# Patient Record
Sex: MALE | Race: WHITE | NOT HISPANIC OR LATINO
[De-identification: names, ages, dates, MRNs, and addresses within clinical notes are randomized per-mention and may not be internally consistent; named-entity substitution may affect disease eponyms.]

---

## 2017-10-02 ENCOUNTER — APPOINTMENT (OUTPATIENT)
Dept: DERMATOLOGY | Facility: CLINIC | Age: 62
End: 2017-10-02
Payer: COMMERCIAL

## 2017-10-02 VITALS — BODY MASS INDEX: 35.21 KG/M2 | WEIGHT: 260 LBS | HEIGHT: 72 IN

## 2017-10-02 DIAGNOSIS — D18.00 HEMANGIOMA UNSPECIFIED SITE: ICD-10-CM

## 2017-10-02 DIAGNOSIS — Z41.1 ENCOUNTER FOR COSMETIC SURGERY: ICD-10-CM

## 2017-10-02 DIAGNOSIS — L91.8 OTHER HYPERTROPHIC DISORDERS OF THE SKIN: ICD-10-CM

## 2017-10-02 DIAGNOSIS — Z85.828 PERSONAL HISTORY OF OTHER MALIGNANT NEOPLASM OF SKIN: ICD-10-CM

## 2017-10-02 DIAGNOSIS — L81.4 OTHER MELANIN HYPERPIGMENTATION: ICD-10-CM

## 2017-10-02 DIAGNOSIS — Z84.0 FAMILY HISTORY OF DISEASES OF THE SKIN AND SUBCUTANEOUS TISSUE: ICD-10-CM

## 2017-10-02 DIAGNOSIS — Z80.8 FAMILY HISTORY OF MALIGNANT NEOPLASM OF OTHER ORGANS OR SYSTEMS: ICD-10-CM

## 2017-10-02 DIAGNOSIS — D48.5 NEOPLASM OF UNCERTAIN BEHAVIOR OF SKIN: ICD-10-CM

## 2017-10-02 DIAGNOSIS — Z00.00 ENCOUNTER FOR GENERAL ADULT MEDICAL EXAMINATION W/OUT ABNORMAL FINDINGS: ICD-10-CM

## 2017-10-02 PROCEDURE — 40808 BIOPSY OF MOUTH LESION: CPT

## 2017-10-02 PROCEDURE — 99213 OFFICE O/P EST LOW 20 MIN: CPT | Mod: 25

## 2017-10-02 PROCEDURE — D0107: CPT

## 2017-10-02 RX ORDER — HYDROCHLOROTHIAZIDE 25 MG/1
25 TABLET ORAL
Refills: 0 | Status: ACTIVE | COMMUNITY

## 2017-10-05 LAB — CORE LAB BIOPSY: NORMAL

## 2018-05-10 ENCOUNTER — APPOINTMENT (OUTPATIENT)
Dept: DERMATOLOGY | Facility: CLINIC | Age: 63
End: 2018-05-10
Payer: COMMERCIAL

## 2018-05-10 DIAGNOSIS — B07.9 VIRAL WART, UNSPECIFIED: ICD-10-CM

## 2018-05-10 PROCEDURE — 17110 DESTRUCTION B9 LES UP TO 14: CPT

## 2018-07-23 PROBLEM — Z85.828 HISTORY OF BASAL CELL CARCINOMA: Status: RESOLVED | Noted: 2017-10-02 | Resolved: 2018-07-23

## 2021-06-20 ENCOUNTER — TRANSCRIPTION ENCOUNTER (OUTPATIENT)
Age: 66
End: 2021-06-20

## 2021-07-02 ENCOUNTER — TRANSCRIPTION ENCOUNTER (OUTPATIENT)
Age: 66
End: 2021-07-02

## 2021-07-29 ENCOUNTER — TRANSCRIPTION ENCOUNTER (OUTPATIENT)
Age: 66
End: 2021-07-29

## 2021-08-24 ENCOUNTER — FORM ENCOUNTER (OUTPATIENT)
Age: 66
End: 2021-08-24

## 2021-08-25 ENCOUNTER — TRANSCRIPTION ENCOUNTER (OUTPATIENT)
Age: 66
End: 2021-08-25

## 2021-08-27 ENCOUNTER — APPOINTMENT (OUTPATIENT)
Dept: DISASTER EMERGENCY | Facility: HOSPITAL | Age: 66
End: 2021-08-27

## 2021-08-27 ENCOUNTER — OUTPATIENT (OUTPATIENT)
Dept: OUTPATIENT SERVICES | Facility: HOSPITAL | Age: 66
LOS: 1 days | End: 2021-08-27
Payer: COMMERCIAL

## 2021-08-27 VITALS
TEMPERATURE: 99 F | HEIGHT: 72 IN | DIASTOLIC BLOOD PRESSURE: 83 MMHG | WEIGHT: 265 LBS | HEART RATE: 89 BPM | OXYGEN SATURATION: 94 % | RESPIRATION RATE: 18 BRPM | SYSTOLIC BLOOD PRESSURE: 148 MMHG

## 2021-08-27 VITALS
RESPIRATION RATE: 18 BRPM | SYSTOLIC BLOOD PRESSURE: 119 MMHG | HEART RATE: 86 BPM | DIASTOLIC BLOOD PRESSURE: 76 MMHG | OXYGEN SATURATION: 95 % | TEMPERATURE: 100 F

## 2021-08-27 DIAGNOSIS — U07.1 COVID-19: ICD-10-CM

## 2021-08-27 PROCEDURE — M0243: CPT

## 2021-08-27 RX ORDER — ACETAMINOPHEN 500 MG
650 TABLET ORAL ONCE
Refills: 0 | Status: COMPLETED | OUTPATIENT
Start: 2021-08-27 | End: 2021-08-27

## 2021-08-27 RX ORDER — SODIUM CHLORIDE 9 MG/ML
250 INJECTION INTRAMUSCULAR; INTRAVENOUS; SUBCUTANEOUS
Refills: 0 | Status: DISCONTINUED | OUTPATIENT
Start: 2021-08-27 | End: 2021-09-10

## 2021-08-27 RX ADMIN — SODIUM CHLORIDE 25 MILLILITER(S): 9 INJECTION INTRAMUSCULAR; INTRAVENOUS; SUBCUTANEOUS at 11:30

## 2021-08-27 RX ADMIN — Medication 650 MILLIGRAM(S): at 12:11

## 2021-08-27 NOTE — CHART NOTE - NSCHARTNOTEFT_GEN_A_CORE
CC: Monoclonal Antibody Infusion/COVID 19 Positiveon 8/25/21      History: Patient presents for infusion of monoclonal antibody infusion. Patient has been screened and was deemed to be a candidate.    Symptoms/ Criteria: Fever, cough, malaise, congestion    Risk Profile includes: HTN, BMI >25, Pt has only 1 kidney    casirivimab/imdevimab in sodium chloride 0.9% (EUA) IVPB 100 milliLiter(s) IV Intermittent once  sodium chloride 0.9%. 250 milliLiter(s) IV Continuous <Continuous>      PMHx:  Infection due to severe acute respiratory syndrome coronavirus 2 (SARS-CoV-2)          T(C): 37.4 (08-27-21 @ 10:20), Max: 37.4 (08-27-21 @ 10:20)  HR: 89 (08-27-21 @ 10:20) (89 - 89)  BP: 148/83 (08-27-21 @ 10:20) (148/83 - 148/83)  RR: 18 (08-27-21 @ 10:20) (18 - 18)  SpO2: 94% (08-27-21 @ 10:20) (94% - 94%)      PE:   Appearance: NAD	  HEENT:   Normal oral mucosa.   Lymphatic: No lymphadenopathy  Cardiovascular: Normal S1 S2, No JVD, No murmurs, No edema  Respiratory: Lungs clear to auscultation	  Gastrointestinal:  Soft, Non-tender. No guarding   Skin: warm and dry  Neurologic: Non-focal  Extremities: Normal range of motion.     ASSESSMENT:  Pt is a 66y year old Male Covid +  referred to the infusion center for Monoclonal antibody infusion (Regeneron).    Pt is fully vaccinated [facility] Moderna last dose-3/2021    PLAN:  - infusion procedure explained to patient   - Consent for monoclonal antibody infusion obtained   - Risk & benefits discussed/all questions answered  - infuse Regeneron over 21 minutes  - will observe patient for one hour post infusion  and then if stable discharge home with oupt follow up as planned by PMD.    POST INFUSION ASSESSMENT:   DISCHARGE at approximately  1  hour post infusion    - Patient tolerated infusion well denies complaints of chest pain/SOB/dizziness/ palps  - VSS for discharge home  - D/C instructions given/ fact sheet included.  - Patient to follow-up with PCP as needed.

## 2021-08-28 ENCOUNTER — TRANSCRIPTION ENCOUNTER (OUTPATIENT)
Age: 66
End: 2021-08-28

## 2021-08-30 ENCOUNTER — APPOINTMENT (OUTPATIENT)
Dept: PULMONOLOGY | Facility: CLINIC | Age: 66
End: 2021-08-30

## 2021-09-15 ENCOUNTER — TRANSCRIPTION ENCOUNTER (OUTPATIENT)
Age: 66
End: 2021-09-15

## 2021-10-01 ENCOUNTER — TRANSCRIPTION ENCOUNTER (OUTPATIENT)
Age: 66
End: 2021-10-01

## 2021-10-15 ENCOUNTER — APPOINTMENT (OUTPATIENT)
Dept: PULMONOLOGY | Facility: CLINIC | Age: 66
End: 2021-10-15
Payer: COMMERCIAL

## 2021-10-15 VITALS
BODY MASS INDEX: 36.21 KG/M2 | SYSTOLIC BLOOD PRESSURE: 140 MMHG | OXYGEN SATURATION: 96 % | DIASTOLIC BLOOD PRESSURE: 80 MMHG | RESPIRATION RATE: 16 BRPM | HEART RATE: 72 BPM | WEIGHT: 267 LBS

## 2021-10-15 DIAGNOSIS — Z86.16 PERSONAL HISTORY OF COVID-19: ICD-10-CM

## 2021-10-15 DIAGNOSIS — N20.0 CALCULUS OF KIDNEY: ICD-10-CM

## 2021-10-15 DIAGNOSIS — Z78.9 OTHER SPECIFIED HEALTH STATUS: ICD-10-CM

## 2021-10-15 PROCEDURE — 99204 OFFICE O/P NEW MOD 45 MIN: CPT

## 2021-10-15 NOTE — REVIEW OF SYSTEMS
[Nasal Congestion] : nasal congestion [Cough] : cough [Chest Tightness] : chest tightness [Sputum] : sputum [SOB on Exertion] : sob on exertion [Fever] : no fever [Chills] : no chills [Postnasal Drip] : no postnasal drip [Sinus Problems] : no sinus problems [Pleuritic Pain] : no pleuritic pain [Wheezing] : no wheezing [Chest Discomfort] : no chest discomfort [Edema] : no edema [Palpitations] : no palpitations [Syncope] : no syncope [Seasonal Allergies] : no seasonal allergies [GERD] : no gerd [Abdominal Pain] : no abdominal pain [Nausea] : no nausea [Vomiting] : no vomiting [Diarrhea] : no diarrhea [Constipation] : no constipation [Back Pain] : no back pain [Anemia] : no anemia [Headache] : no headache [Seizures] : no seizures [Dizziness] : no dizziness [Numbness] : no numbness [Paralysis] : no paralysis [Confusion] : no confusion [Depression] : no depression [Anxiety] : no anxiety [Diabetes] : no diabetes [Thyroid Problem] : no thyroid problem

## 2021-10-15 NOTE — DISCUSSION/SUMMARY
[FreeTextEntry1] : \par #1. Will schedule PFTs in near future to assess lung function \par #2. The patient does not appear to require chronic BD therapy at this time\par #3. Diet and exercise for weight loss\par #4. SOBOE is likely related to weight or deconditioning \par #5. Brief prednisone taper for residual cough\par #6. CXR to evaluate SOBOE given recent Covid infection\par #7. Albuterol inhaler as needed given wheeze\par #8. F/u in one month with CXR and PFTs\par #9. Pt had both Covid vaccines \par #10. Reviewed risks of exposure and symptoms of Covid-19 virus, including how the virus is spread and precautions to avoid ben virus.\par \par Patient's questions were answered and patient appears to understand these recommendations

## 2021-10-15 NOTE — PHYSICAL EXAM
[No Acute Distress] : no acute distress [Well Nourished] : well nourished [Well Developed] : well developed [Low Lying Soft Palate] : low lying soft palate [Enlarged Base of the Tongue] : enlarged base of the tongue [IV] : Mallampati Class: IV [Normal Appearance] : normal appearance [Supple] : supple [Normal Rate/Rhythm] : normal rate/rhythm [Normal S1, S2] : normal s1, s2 [No Murmurs] : no murmurs [No Resp Distress] : no resp distress [No Acc Muscle Use] : no acc muscle use [Normal Rhythm and Effort] : normal rhythm and effort [Clear to Auscultation Bilaterally] : clear to auscultation bilaterally [No Abnormalities] : no abnormalities [Benign] : benign [Not Tender] : not tender [Soft] : soft [No Clubbing] : no clubbing [No Edema] : no edema [No Focal Deficits] : no focal deficits [Oriented x3] : oriented x3 [TextBox_2] : Pt is obese [TextBox_11] : Moderate to severe oropharyngeal crowding.

## 2021-10-15 NOTE — HISTORY OF PRESENT ILLNESS
[Never] : never [Initial Evaluation] : an initial evaluation of [Excess Weight] : excess weight [Currently Experiencing] : The patient is currently experiencing symptoms. [Dyspnea] : dyspnea [Low Calorie Diet] : low calorie diet [Fair Compliance] : fair compliance with treatment [Fair Tolerance] : fair tolerance of treatment [Poor Symptom Control] : poor symptom control [None] : No associated conditions are noted [High] : high [Low Calorie] : low calorie [Well Balanced Diet] : well balanced meals [___ Times/Week] : exercises [unfilled] times per week [Aerobic Conditioning] : aerobic conditioning [Strength Training] : strength training [TextBox_4] : Pt reports having Covid infection 8/25/21 when he developed low grade fever, loss of taste and smell, cough, SOB. He received Monoclonal ab therapy with improvement. He is now back to baseline other than congested, productive cough, and SOBOE.\par He is a never smoker.\par He usually exercises regularly.\par Pt denies significant sleep complaints. \par He recently completed a course of Z-obdulia for cough and purulent sputum with some improvement.\par

## 2021-10-15 NOTE — REASON FOR VISIT
[Initial] : an initial visit [Cough] : cough [Shortness of Breath] : shortness of breath [Obesity] : obesity [TextBox_44] : prior Covid infection

## 2021-10-15 NOTE — CONSULT LETTER
[Dear  ___] : Dear  [unfilled], [Consult Letter:] : I had the pleasure of evaluating your patient, [unfilled]. [Please see my note below.] : Please see my note below. [Consult Closing:] : Thank you very much for allowing me to participate in the care of this patient.  If you have any questions, please do not hesitate to contact me. [Sincerely,] : Sincerely, [FreeTextEntry3] : Harvey Donald MD, FCCP, D. ABSM\par Pulmonary and Sleep Medicine\par [facility] at [place]

## 2022-11-09 ENCOUNTER — NON-APPOINTMENT (OUTPATIENT)
Age: 67
End: 2022-11-09

## 2023-07-27 ENCOUNTER — APPOINTMENT (OUTPATIENT)
Dept: PULMONOLOGY | Facility: CLINIC | Age: 68
End: 2023-07-27
Payer: MEDICARE

## 2023-07-27 VITALS
SYSTOLIC BLOOD PRESSURE: 122 MMHG | HEART RATE: 77 BPM | DIASTOLIC BLOOD PRESSURE: 68 MMHG | RESPIRATION RATE: 16 BRPM | BODY MASS INDEX: 35.62 KG/M2 | WEIGHT: 263 LBS | OXYGEN SATURATION: 96 % | HEIGHT: 72 IN

## 2023-07-27 DIAGNOSIS — E66.9 OBESITY, UNSPECIFIED: ICD-10-CM

## 2023-07-27 DIAGNOSIS — R06.02 SHORTNESS OF BREATH: ICD-10-CM

## 2023-07-27 DIAGNOSIS — R05.9 COUGH, UNSPECIFIED: ICD-10-CM

## 2023-07-27 DIAGNOSIS — R06.2 WHEEZING: ICD-10-CM

## 2023-07-27 PROCEDURE — 99214 OFFICE O/P EST MOD 30 MIN: CPT

## 2023-07-27 RX ORDER — ALLOPURINOL 100 MG/1
100 TABLET ORAL
Refills: 0 | Status: ACTIVE | COMMUNITY

## 2023-07-27 RX ORDER — ENALAPRIL MALEATE 5 MG/1
TABLET ORAL
Refills: 0 | Status: ACTIVE | COMMUNITY

## 2023-07-27 RX ORDER — PREDNISONE 10 MG/1
10 TABLET ORAL
Qty: 50 | Refills: 0 | Status: DISCONTINUED | COMMUNITY
Start: 2021-10-15 | End: 2023-07-27

## 2023-07-27 RX ORDER — ALBUTEROL SULFATE 90 UG/1
108 (90 BASE) AEROSOL, METERED RESPIRATORY (INHALATION)
Qty: 1 | Refills: 2 | Status: DISCONTINUED | COMMUNITY
Start: 2021-10-15 | End: 2023-07-27

## 2023-07-27 NOTE — CONSULT LETTER
[Dear  ___] : Dear  [unfilled], [Consult Letter:] : I had the pleasure of evaluating your patient, [unfilled]. [Please see my note below.] : Please see my note below. [Consult Closing:] : Thank you very much for allowing me to participate in the care of this patient.  If you have any questions, please do not hesitate to contact me. [Sincerely,] : Sincerely, [FreeTextEntry3] : Harvey Donald MD, FCCP, D. ABSM\par Pulmonary and Sleep Medicine\par [facility] at [place]

## 2023-07-27 NOTE — DISCUSSION/SUMMARY
[FreeTextEntry1] : \par #1. Will schedule PFTs in near future to assess lung function \par #2. The patient does not appear to require chronic BD therapy at this time\par #3. Diet and exercise for weight loss\par #4. SOBOE is likely related to weight or deconditioning \par #5. CXR to evaluate SOBOE \par #6. Offered Albuterol inhaler as needed but he would like to hold off for now\par #7. Consider cardiac w/u for possible cause of SOBOE\par #8. F/u As soon as can be scheduled with CXR and PFTs/gretchen\par #9. Pt had both Covid vaccines \par \par The patient expressed understanding and agreement with the above recommendations/plan and accepts responsibility to be compliant with recommended testing, therapies, and f/u visits.\par All relevant questions and concerns were addressed.

## 2023-07-27 NOTE — HISTORY OF PRESENT ILLNESS
[Excess Weight] : excess weight [Currently Experiencing] : The patient is currently experiencing symptoms. [Dyspnea] : dyspnea [Low Calorie Diet] : low calorie diet [Fair Compliance] : fair compliance with treatment [Fair Tolerance] : fair tolerance of treatment [Poor Symptom Control] : poor symptom control [None] : No associated conditions are noted [High] : high [Low Calorie] : low calorie [Well Balanced Diet] : well balanced meals [___ Times/Week] : exercises [unfilled] times per week [Aerobic Conditioning] : aerobic conditioning [Strength Training] : strength training [Follow-Up - Routine Clinic] : a routine clinic follow-up of [TextBox_4] : Never smoker.\par Pt reports having Covid infection 8/25/21 when he developed low grade fever, loss of taste and smell, cough, SOB. He received Monoclonal ab therapy with improvement. He is now back to baseline other than congested, productive cough, and SOBOE.\par Repeat Covid infection 2/2022 with 1-2 days of URI symptoms and resolved spontaneously; he did not require therapy\par He usually exercises regularly.\par Pt denies significant sleep complaints. \par He recently completed a course of Z-obdulia for cough and purulent sputum with some improvement.\par Last seen 10/2021\par Significant SOBOE with aerobic exercise or heavy lifting. Improves with deep breathing.\par Reports only having one functioning kidney.\par

## 2023-07-27 NOTE — REASON FOR VISIT
[Follow-Up] : a follow-up visit [Cough] : cough [Shortness of Breath] : shortness of breath [Obesity] : obesity [TextBox_44] : prior Covid infection

## 2023-08-08 ENCOUNTER — NON-APPOINTMENT (OUTPATIENT)
Age: 68
End: 2023-08-08

## 2023-10-25 ENCOUNTER — APPOINTMENT (OUTPATIENT)
Dept: PULMONOLOGY | Facility: CLINIC | Age: 68
End: 2023-10-25

## 2024-06-20 ENCOUNTER — OFFICE (OUTPATIENT)
Dept: URBAN - METROPOLITAN AREA CLINIC 94 | Facility: CLINIC | Age: 69
Setting detail: OPHTHALMOLOGY
End: 2024-06-20
Payer: MEDICARE

## 2024-06-20 DIAGNOSIS — H25.13: ICD-10-CM

## 2024-06-20 DIAGNOSIS — H43.393: ICD-10-CM

## 2024-06-20 PROCEDURE — 92004 COMPRE OPH EXAM NEW PT 1/>: CPT | Performed by: OPHTHALMOLOGY

## 2024-06-20 PROCEDURE — 92250 FUNDUS PHOTOGRAPHY W/I&R: CPT | Performed by: OPHTHALMOLOGY

## 2024-06-20 ASSESSMENT — CONFRONTATIONAL VISUAL FIELD TEST (CVF)
OS_FINDINGS: FULL
OD_FINDINGS: FULL

## 2024-07-05 ENCOUNTER — OFFICE (OUTPATIENT)
Dept: URBAN - METROPOLITAN AREA CLINIC 94 | Facility: CLINIC | Age: 69
Setting detail: OPHTHALMOLOGY
End: 2024-07-05
Payer: MEDICARE

## 2024-07-05 DIAGNOSIS — H43.393: ICD-10-CM

## 2024-07-05 DIAGNOSIS — H25.12: ICD-10-CM

## 2024-07-05 DIAGNOSIS — H33.312: ICD-10-CM

## 2024-07-05 DIAGNOSIS — H25.11: ICD-10-CM

## 2024-07-05 DIAGNOSIS — H25.13: ICD-10-CM

## 2024-07-05 PROCEDURE — 92250 FUNDUS PHOTOGRAPHY W/I&R: CPT | Performed by: OPHTHALMOLOGY

## 2024-07-05 PROCEDURE — 92014 COMPRE OPH EXAM EST PT 1/>: CPT | Performed by: OPHTHALMOLOGY

## 2024-07-05 ASSESSMENT — CONFRONTATIONAL VISUAL FIELD TEST (CVF)
OD_FINDINGS: FULL
OS_FINDINGS: FULL

## 2024-08-13 ENCOUNTER — ASC (OUTPATIENT)
Dept: URBAN - METROPOLITAN AREA SURGERY 8 | Facility: SURGERY | Age: 69
Setting detail: OPHTHALMOLOGY
End: 2024-08-13
Payer: MEDICARE

## 2024-08-13 DIAGNOSIS — H25.11: ICD-10-CM

## 2024-08-13 DIAGNOSIS — H52.211: ICD-10-CM

## 2024-08-13 PROCEDURE — 66984 XCAPSL CTRC RMVL W/O ECP: CPT | Mod: RT | Performed by: OPHTHALMOLOGY

## 2024-08-13 PROCEDURE — FEMTO FEMTOSECOND LASER: Mod: GY | Performed by: OPHTHALMOLOGY

## 2024-08-13 PROCEDURE — 68841 INSJ RX ELUT IMPLT LAC CANAL: CPT | Mod: RT | Performed by: OPHTHALMOLOGY

## 2024-08-13 PROCEDURE — VIVITY VIVITY: Performed by: OPHTHALMOLOGY

## 2024-08-13 PROCEDURE — A9270 NON-COVERED ITEM OR SERVICE: HCPCS | Mod: GY | Performed by: OPHTHALMOLOGY

## 2024-08-14 ENCOUNTER — RX ONLY (RX ONLY)
Age: 69
End: 2024-08-14

## 2024-08-14 ENCOUNTER — OFFICE (OUTPATIENT)
Dept: URBAN - METROPOLITAN AREA CLINIC 94 | Facility: CLINIC | Age: 69
Setting detail: OPHTHALMOLOGY
End: 2024-08-14
Payer: MEDICARE

## 2024-08-14 DIAGNOSIS — Z96.1: ICD-10-CM

## 2024-08-14 PROCEDURE — 99024 POSTOP FOLLOW-UP VISIT: CPT | Performed by: PHYSICIAN ASSISTANT

## 2024-08-14 ASSESSMENT — CONFRONTATIONAL VISUAL FIELD TEST (CVF)
OD_FINDINGS: FULL
OS_FINDINGS: FULL

## 2024-08-21 ENCOUNTER — OFFICE (OUTPATIENT)
Dept: URBAN - METROPOLITAN AREA CLINIC 94 | Facility: CLINIC | Age: 69
Setting detail: OPHTHALMOLOGY
End: 2024-08-21
Payer: MEDICARE

## 2024-08-21 DIAGNOSIS — H25.12: ICD-10-CM

## 2024-08-21 DIAGNOSIS — Z96.1: ICD-10-CM

## 2024-08-21 PROCEDURE — 99024 POSTOP FOLLOW-UP VISIT: CPT | Performed by: OPHTHALMOLOGY

## 2024-08-21 PROCEDURE — 92136 OPHTHALMIC BIOMETRY: CPT | Mod: 26,LT | Performed by: OPHTHALMOLOGY

## 2024-08-21 ASSESSMENT — CONFRONTATIONAL VISUAL FIELD TEST (CVF)
OS_FINDINGS: FULL
OD_FINDINGS: FULL

## 2024-08-28 ENCOUNTER — ASC (OUTPATIENT)
Dept: URBAN - METROPOLITAN AREA SURGERY 8 | Facility: SURGERY | Age: 69
Setting detail: OPHTHALMOLOGY
End: 2024-08-28
Payer: MEDICARE

## 2024-08-28 DIAGNOSIS — H52.212: ICD-10-CM

## 2024-08-28 DIAGNOSIS — H25.12: ICD-10-CM

## 2024-08-28 PROCEDURE — A9270 NON-COVERED ITEM OR SERVICE: HCPCS | Mod: GY | Performed by: OPHTHALMOLOGY

## 2024-08-28 PROCEDURE — 68841 INSJ RX ELUT IMPLT LAC CANAL: CPT | Mod: 79,LT | Performed by: OPHTHALMOLOGY

## 2024-08-28 PROCEDURE — V2787 ASTIGMATISM-CORRECT FUNCTION: HCPCS | Performed by: OPHTHALMOLOGY

## 2024-08-28 PROCEDURE — 66984 XCAPSL CTRC RMVL W/O ECP: CPT | Mod: 79,LT | Performed by: OPHTHALMOLOGY

## 2024-08-28 PROCEDURE — FEMTO FEMTOSECOND LASER: Mod: GY | Performed by: OPHTHALMOLOGY

## 2024-08-29 ENCOUNTER — OFFICE (OUTPATIENT)
Dept: URBAN - METROPOLITAN AREA CLINIC 112 | Facility: CLINIC | Age: 69
Setting detail: OPHTHALMOLOGY
End: 2024-08-29
Payer: MEDICARE

## 2024-08-29 DIAGNOSIS — Z96.1: ICD-10-CM

## 2024-08-29 PROBLEM — H53.001 AMBLYOPIA; RIGHT EYE: Status: ACTIVE | Noted: 2024-08-21

## 2024-08-29 PROBLEM — H26.491 POSTERIOR CAPSULAR OPACIFICATION; RIGHT EYE: Status: ACTIVE | Noted: 2024-08-21

## 2024-08-29 PROCEDURE — 99024 POSTOP FOLLOW-UP VISIT: CPT | Performed by: OPHTHALMOLOGY

## 2024-08-29 ASSESSMENT — CONFRONTATIONAL VISUAL FIELD TEST (CVF)
OD_FINDINGS: FULL
OS_FINDINGS: FULL

## 2024-09-05 ENCOUNTER — RX ONLY (RX ONLY)
Age: 69
End: 2024-09-05

## 2024-09-05 ENCOUNTER — OFFICE (OUTPATIENT)
Dept: URBAN - METROPOLITAN AREA CLINIC 112 | Facility: CLINIC | Age: 69
Setting detail: OPHTHALMOLOGY
End: 2024-09-05
Payer: MEDICARE

## 2024-09-05 DIAGNOSIS — Z96.1: ICD-10-CM

## 2024-09-05 PROCEDURE — 99024 POSTOP FOLLOW-UP VISIT: CPT | Performed by: PHYSICIAN ASSISTANT

## 2024-09-05 ASSESSMENT — CONFRONTATIONAL VISUAL FIELD TEST (CVF)
OD_FINDINGS: FULL
OS_FINDINGS: FULL

## 2024-09-12 ENCOUNTER — OFFICE (OUTPATIENT)
Dept: URBAN - METROPOLITAN AREA CLINIC 112 | Facility: CLINIC | Age: 69
Setting detail: OPHTHALMOLOGY
End: 2024-09-12
Payer: MEDICARE

## 2024-09-12 ENCOUNTER — OFFICE (OUTPATIENT)
Dept: URBAN - METROPOLITAN AREA CLINIC 94 | Facility: CLINIC | Age: 69
Setting detail: OPHTHALMOLOGY
End: 2024-09-12
Payer: MEDICARE

## 2024-09-12 DIAGNOSIS — Z96.1: ICD-10-CM

## 2024-09-12 DIAGNOSIS — S05.02XD: ICD-10-CM

## 2024-09-12 PROCEDURE — 99024 POSTOP FOLLOW-UP VISIT: CPT | Performed by: PHYSICIAN ASSISTANT

## 2024-09-12 PROCEDURE — 99024 POSTOP FOLLOW-UP VISIT: CPT | Performed by: OPHTHALMOLOGY

## 2024-09-12 ASSESSMENT — CONFRONTATIONAL VISUAL FIELD TEST (CVF)
OS_FINDINGS: FULL
OS_FINDINGS: FULL
OD_FINDINGS: FULL
OD_FINDINGS: FULL

## 2024-09-14 ENCOUNTER — RX ONLY (RX ONLY)
Age: 69
End: 2024-09-14

## 2024-09-14 ENCOUNTER — OFFICE (OUTPATIENT)
Dept: URBAN - METROPOLITAN AREA CLINIC 94 | Facility: CLINIC | Age: 69
Setting detail: OPHTHALMOLOGY
End: 2024-09-14
Payer: MEDICARE

## 2024-09-14 DIAGNOSIS — H26.493: ICD-10-CM

## 2024-09-14 DIAGNOSIS — Z96.1: ICD-10-CM

## 2024-09-14 DIAGNOSIS — S05.02XD: ICD-10-CM

## 2024-09-14 PROCEDURE — 99024 POSTOP FOLLOW-UP VISIT: CPT | Performed by: PHYSICIAN ASSISTANT

## 2024-09-14 ASSESSMENT — CONFRONTATIONAL VISUAL FIELD TEST (CVF)
OS_FINDINGS: FULL
OD_FINDINGS: FULL

## 2024-09-15 ENCOUNTER — OFFICE (OUTPATIENT)
Dept: URBAN - METROPOLITAN AREA CLINIC 94 | Facility: CLINIC | Age: 69
Setting detail: OPHTHALMOLOGY
End: 2024-09-15
Payer: MEDICARE

## 2024-09-15 DIAGNOSIS — Z96.1: ICD-10-CM

## 2024-09-15 DIAGNOSIS — S05.02XD: ICD-10-CM

## 2024-09-15 PROCEDURE — 92071 CONTACT LENS FITTING FOR TX: CPT | Mod: LT | Performed by: REGISTERED NURSE

## 2024-09-15 PROCEDURE — 99024 POSTOP FOLLOW-UP VISIT: CPT | Performed by: REGISTERED NURSE

## 2024-09-15 ASSESSMENT — CONFRONTATIONAL VISUAL FIELD TEST (CVF)
OD_FINDINGS: FULL
OS_FINDINGS: FULL

## 2024-09-20 ENCOUNTER — OFFICE (OUTPATIENT)
Dept: URBAN - METROPOLITAN AREA CLINIC 94 | Facility: CLINIC | Age: 69
Setting detail: OPHTHALMOLOGY
End: 2024-09-20
Payer: MEDICARE

## 2024-09-20 DIAGNOSIS — Z96.1: ICD-10-CM

## 2024-09-20 DIAGNOSIS — S05.02XD: ICD-10-CM

## 2024-09-20 PROCEDURE — 99024 POSTOP FOLLOW-UP VISIT: CPT | Performed by: OPHTHALMOLOGY

## 2024-09-20 ASSESSMENT — CONFRONTATIONAL VISUAL FIELD TEST (CVF)
OS_FINDINGS: FULL
OD_FINDINGS: FULL

## 2024-09-23 ENCOUNTER — OFFICE (OUTPATIENT)
Dept: URBAN - METROPOLITAN AREA CLINIC 94 | Facility: CLINIC | Age: 69
Setting detail: OPHTHALMOLOGY
End: 2024-09-23
Payer: MEDICARE

## 2024-09-23 DIAGNOSIS — Z96.1: ICD-10-CM

## 2024-09-23 DIAGNOSIS — S05.02XA: ICD-10-CM

## 2024-09-23 PROCEDURE — 99024 POSTOP FOLLOW-UP VISIT: CPT | Performed by: PHYSICIAN ASSISTANT

## 2024-09-23 ASSESSMENT — CONFRONTATIONAL VISUAL FIELD TEST (CVF)
OD_FINDINGS: FULL
OS_FINDINGS: FULL

## 2024-09-24 ENCOUNTER — OFFICE (OUTPATIENT)
Dept: URBAN - METROPOLITAN AREA CLINIC 114 | Facility: CLINIC | Age: 69
Setting detail: OPHTHALMOLOGY
End: 2024-09-24
Payer: MEDICARE

## 2024-09-24 DIAGNOSIS — S05.02XA: ICD-10-CM

## 2024-09-24 DIAGNOSIS — Z96.1: ICD-10-CM

## 2024-09-24 PROCEDURE — 99024 POSTOP FOLLOW-UP VISIT: CPT | Performed by: OPHTHALMOLOGY

## 2024-09-24 ASSESSMENT — CONFRONTATIONAL VISUAL FIELD TEST (CVF)
OD_FINDINGS: FULL
OS_FINDINGS: FULL

## 2024-09-26 ENCOUNTER — OFFICE (OUTPATIENT)
Dept: URBAN - METROPOLITAN AREA CLINIC 94 | Facility: CLINIC | Age: 69
Setting detail: OPHTHALMOLOGY
End: 2024-09-26
Payer: MEDICARE

## 2024-09-26 DIAGNOSIS — Z96.1: ICD-10-CM

## 2024-09-26 DIAGNOSIS — S05.02XA: ICD-10-CM

## 2024-09-26 PROCEDURE — 99024 POSTOP FOLLOW-UP VISIT: CPT | Performed by: OPHTHALMOLOGY

## 2024-09-26 ASSESSMENT — CONFRONTATIONAL VISUAL FIELD TEST (CVF)
OD_FINDINGS: FULL
OS_FINDINGS: FULL

## 2024-10-01 ENCOUNTER — OFFICE (OUTPATIENT)
Dept: URBAN - METROPOLITAN AREA CLINIC 112 | Facility: CLINIC | Age: 69
Setting detail: OPHTHALMOLOGY
End: 2024-10-01
Payer: MEDICARE

## 2024-10-01 DIAGNOSIS — Z96.1: ICD-10-CM

## 2024-10-01 DIAGNOSIS — S05.02XA: ICD-10-CM

## 2024-10-01 PROBLEM — H26.493 POSTERIOR CAPSULAR OPACIFICATION; BOTH EYES: Status: ACTIVE | Noted: 2024-09-12

## 2024-10-01 PROCEDURE — 99024 POSTOP FOLLOW-UP VISIT: CPT | Performed by: OPHTHALMOLOGY

## 2024-10-01 ASSESSMENT — KERATOMETRY
OD_AXISANGLE_DEGREES: 150
OD_K1POWER_DIOPTERS: 41.25
OD_K2POWER_DIOPTERS: 45.50
OS_K2POWER_DIOPTERS: 45.00
OS_K1POWER_DIOPTERS: 42.50
OS_AXISANGLE_DEGREES: 178
METHOD_AUTO_MANUAL: AUTO

## 2024-10-01 ASSESSMENT — REFRACTION_MANIFEST
OD_VA1: 20/30+
OS_SPHERE: +1.25
OS_SPHERE: +5.50
OD_SPHERE: +1.75
OD_AXIS: 055
OD_SPHERE: +4.75
OD_ADD: +2.00
OS_CYLINDER: -4.75
OS_CYLINDER: -2.50
OS_AXIS: 110
OS_SPHERE: +5.75
OD_CYLINDER: -4.25
OS_VA1: 20/20
OS_VA1: 20/50
OS_ADD: +2.00
OS_AXIS: 035
OD_AXIS: 074
OS_VA1: 20/20
OD_CYLINDER: -4.25
OD_SPHERE: +4.75
OD_VA1: 20/50
OD_AXIS: 060
OD_CYLINDER: -2.25
OS_AXIS: 110
OS_CYLINDER: -4.50
OD_VA1: 20/30

## 2024-10-01 ASSESSMENT — REFRACTION_CURRENTRX
OD_SPHERE: +6.00
OS_OVR_VA: 20/
OS_VPRISM_DIRECTION: SV
OS_CYLINDER: -4.00
OS_AXIS: 106
OD_AXIS: 070
OS_OVR_VA: 20/
OS_CYLINDER: -6.00
OS_SPHERE: +6.25
OD_SPHERE: +6.00
OS_AXIS: 108
OD_CYLINDER: -4.50
OD_OVR_VA: 20/
OD_OVR_VA: 20/
OS_SPHERE: +6.00
OD_VPRISM_DIRECTION: SV
OD_CYLINDER: -6.00
OD_AXIS: 51

## 2024-10-01 ASSESSMENT — VISUAL ACUITY
OD_BCVA: 20/40
OS_BCVA: 20/80

## 2024-10-01 ASSESSMENT — CONFRONTATIONAL VISUAL FIELD TEST (CVF)
OD_FINDINGS: FULL
OS_FINDINGS: FULL

## 2024-10-01 ASSESSMENT — REFRACTION_AUTOREFRACTION
OD_SPHERE: +0.75
OS_AXIS: 52
OS_CYLINDER: -1.70
OD_AXIS: 072
OD_CYLINDER: -2.50
OS_SPHERE: +0.25

## 2024-10-01 ASSESSMENT — TONOMETRY
OD_IOP_MMHG: 12
OS_IOP_MMHG: 10

## 2024-11-08 ENCOUNTER — OFFICE (OUTPATIENT)
Dept: URBAN - METROPOLITAN AREA CLINIC 94 | Facility: CLINIC | Age: 69
Setting detail: OPHTHALMOLOGY
End: 2024-11-08
Payer: MEDICARE

## 2024-11-08 DIAGNOSIS — H17.822: ICD-10-CM

## 2024-11-08 DIAGNOSIS — S05.02XA: ICD-10-CM

## 2024-11-08 DIAGNOSIS — Z96.1: ICD-10-CM

## 2024-11-08 PROBLEM — S05.02XD CORNEAL ABRASION; SUBSEQUENT ENCOUNTER: Status: ACTIVE | Noted: 2024-11-08

## 2024-11-08 PROCEDURE — 99024 POSTOP FOLLOW-UP VISIT: CPT | Performed by: OPHTHALMOLOGY

## 2024-11-08 ASSESSMENT — REFRACTION_CURRENTRX
OD_SPHERE: +6.00
OS_OVR_VA: 20/
OD_OVR_VA: 20/
OD_AXIS: 070
OS_AXIS: 108
OD_OVR_VA: 20/
OD_OVR_VA: 20/
OS_VPRISM_DIRECTION: SV
OS_SPHERE: +6.25
OS_CYLINDER: -6.00
OD_AXIS: 51
OD_SPHERE: +6.00
OD_AXIS: 070
OD_SPHERE: +6.00
OD_CYLINDER: -6.00
OS_AXIS: 106
OS_OVR_VA: 20/
OS_AXIS: 106
OD_VPRISM_DIRECTION: SV
OS_CYLINDER: -4.00
OD_CYLINDER: -4.50
OS_SPHERE: +6.25
OD_CYLINDER: -6.00
OS_AXIS: 108
OD_SPHERE: +6.00
OS_VPRISM_DIRECTION: SV
OD_CYLINDER: -4.50
OS_SPHERE: +6.00
OS_OVR_VA: 20/
OS_OVR_VA: 20/
OS_CYLINDER: -6.00
OD_AXIS: 51
OD_OVR_VA: 20/
OS_CYLINDER: -4.00
OS_SPHERE: +6.00
OD_VPRISM_DIRECTION: SV

## 2024-11-08 ASSESSMENT — REFRACTION_MANIFEST
OD_CYLINDER: -4.25
OS_AXIS: 035
OS_CYLINDER: -4.50
OD_ADD: +2.00
OS_AXIS: 110
OD_SPHERE: +1.75
OS_SPHERE: +5.50
OD_VA1: 20/30+
OD_VA1: 20/30
OD_CYLINDER: -2.25
OS_SPHERE: +5.75
OD_AXIS: 074
OS_CYLINDER: -4.50
OD_AXIS: 074
OD_SPHERE: +4.75
OS_SPHERE: +5.50
OD_SPHERE: +4.75
OD_CYLINDER: -4.25
OS_CYLINDER: -4.75
OS_VA1: 20/20
OD_SPHERE: +4.75
OS_SPHERE: +1.25
OS_SPHERE: +1.25
OS_VA1: 20/20
OS_CYLINDER: -2.50
OD_VA1: 20/50
OS_VA1: 20/20
OD_AXIS: 055
OD_CYLINDER: -2.25
OS_AXIS: 110
OS_ADD: +2.00
OS_ADD: +2.00
OD_AXIS: 060
OS_VA1: 20/50
OD_SPHERE: +1.75
OS_CYLINDER: -2.50
OD_CYLINDER: -4.25
OD_AXIS: 060
OD_AXIS: 055
OD_ADD: +2.00
OS_AXIS: 035
OS_VA1: 20/20
OD_SPHERE: +4.75
OS_VA1: 20/50
OS_AXIS: 110
OS_AXIS: 110
OS_SPHERE: +5.75
OD_VA1: 20/50
OD_CYLINDER: -4.25
OD_VA1: 20/30+
OD_VA1: 20/30
OS_CYLINDER: -4.75

## 2024-11-08 ASSESSMENT — CONFRONTATIONAL VISUAL FIELD TEST (CVF)
OS_FINDINGS: FULL
OS_FINDINGS: FULL
OD_FINDINGS: FULL
OD_FINDINGS: FULL

## 2024-11-08 ASSESSMENT — KERATOMETRY
OD_K1POWER_DIOPTERS: 41.00
OD_K1POWER_DIOPTERS: 41.00
OD_K2POWER_DIOPTERS: 45.25
OS_AXISANGLE_DEGREES: 175
OD_AXISANGLE_DEGREES: 154
OS_K1POWER_DIOPTERS: 41.75
OD_K2POWER_DIOPTERS: 45.25
METHOD_AUTO_MANUAL: AUTO
OS_K1POWER_DIOPTERS: 41.75
METHOD_AUTO_MANUAL: AUTO
OS_K2POWER_DIOPTERS: 44.75
OS_AXISANGLE_DEGREES: 175
OS_K2POWER_DIOPTERS: 44.75
OD_AXISANGLE_DEGREES: 154

## 2024-11-08 ASSESSMENT — TONOMETRY
OD_IOP_MMHG: 13
OS_IOP_MMHG: 15
OD_IOP_MMHG: 13
OS_IOP_MMHG: 15

## 2024-11-08 ASSESSMENT — VISUAL ACUITY
OS_BCVA: 20/80
OD_BCVA: 20/50-1
OD_BCVA: 20/50-1
OS_BCVA: 20/80

## 2024-11-08 ASSESSMENT — REFRACTION_AUTOREFRACTION
OS_AXIS: 058
OS_AXIS: 058
OD_AXIS: 078
OD_SPHERE: +0.75
OS_SPHERE: +2.00
OS_SPHERE: +2.00
OD_AXIS: 078
OD_SPHERE: +0.75
OS_CYLINDER: -1.75
OS_CYLINDER: -1.75
OD_CYLINDER: -2.25
OD_CYLINDER: -2.25

## 2024-12-11 ENCOUNTER — OFFICE (OUTPATIENT)
Dept: URBAN - METROPOLITAN AREA CLINIC 94 | Facility: CLINIC | Age: 69
Setting detail: OPHTHALMOLOGY
End: 2024-12-11
Payer: MEDICARE

## 2024-12-11 DIAGNOSIS — Z96.1: ICD-10-CM

## 2024-12-11 DIAGNOSIS — H17.822: ICD-10-CM

## 2024-12-11 PROCEDURE — 99213 OFFICE O/P EST LOW 20 MIN: CPT | Performed by: OPHTHALMOLOGY

## 2024-12-11 ASSESSMENT — REFRACTION_AUTOREFRACTION
OD_SPHERE: +1.00
OS_AXIS: 075
OS_CYLINDER: -2.50
OD_CYLINDER: -3.25
OD_AXIS: 075
OS_SPHERE: +1.75

## 2024-12-11 ASSESSMENT — REFRACTION_MANIFEST
OD_SPHERE: +4.75
OD_AXIS: 074
OD_SPHERE: +1.75
OS_VA1: 20/50
OS_SPHERE: +5.50
OD_CYLINDER: -4.25
OS_CYLINDER: -4.75
OS_ADD: +2.00
OD_AXIS: 060
OS_SPHERE: +5.75
OD_SPHERE: +4.75
OS_VA1: 20/20
OS_AXIS: 110
OS_VA1: 20/20
OS_AXIS: 035
OD_CYLINDER: -4.25
OD_AXIS: 055
OS_CYLINDER: -2.50
OD_ADD: +2.00
OD_CYLINDER: -2.25
OS_SPHERE: +1.25
OD_VA1: 20/30
OD_VA1: 20/50
OS_AXIS: 110
OD_VA1: 20/30+
OS_CYLINDER: -4.50

## 2024-12-11 ASSESSMENT — TONOMETRY
OD_IOP_MMHG: 12
OS_IOP_MMHG: 14

## 2024-12-11 ASSESSMENT — REFRACTION_CURRENTRX
OD_OVR_VA: 20/
OS_VPRISM_DIRECTION: SV
OS_AXIS: 106
OD_AXIS: 51
OS_OVR_VA: 20/
OS_SPHERE: +6.25
OD_CYLINDER: -4.50
OD_OVR_VA: 20/
OS_AXIS: 108
OD_SPHERE: +6.00
OS_CYLINDER: -4.00
OS_OVR_VA: 20/
OD_CYLINDER: -6.00
OS_SPHERE: +6.00
OD_SPHERE: +6.00
OD_VPRISM_DIRECTION: SV
OD_AXIS: 070
OS_CYLINDER: -6.00

## 2024-12-11 ASSESSMENT — KERATOMETRY
OS_K2POWER_DIOPTERS: 44.75
OS_K1POWER_DIOPTERS: 41.50
METHOD_AUTO_MANUAL: AUTO
OD_K2POWER_DIOPTERS: 45.75
OS_AXISANGLE_DEGREES: 000
OD_AXISANGLE_DEGREES: 160
OD_K1POWER_DIOPTERS: 41.00

## 2024-12-11 ASSESSMENT — VISUAL ACUITY
OS_BCVA: 20/80-1
OD_BCVA: 20/60-1

## 2024-12-11 ASSESSMENT — CONFRONTATIONAL VISUAL FIELD TEST (CVF)
OD_FINDINGS: FULL
OS_FINDINGS: FULL

## 2025-03-13 ENCOUNTER — OFFICE (OUTPATIENT)
Dept: URBAN - METROPOLITAN AREA CLINIC 112 | Facility: CLINIC | Age: 70
Setting detail: OPHTHALMOLOGY
End: 2025-03-13
Payer: MEDICARE

## 2025-03-13 DIAGNOSIS — H16.223: ICD-10-CM

## 2025-03-13 DIAGNOSIS — H01.001: ICD-10-CM

## 2025-03-13 DIAGNOSIS — H17.822: ICD-10-CM

## 2025-03-13 DIAGNOSIS — H26.491: ICD-10-CM

## 2025-03-13 DIAGNOSIS — H26.492: ICD-10-CM

## 2025-03-13 DIAGNOSIS — H01.004: ICD-10-CM

## 2025-03-13 DIAGNOSIS — Z96.1: ICD-10-CM

## 2025-03-13 DIAGNOSIS — H26.493: ICD-10-CM

## 2025-03-13 PROCEDURE — 99213 OFFICE O/P EST LOW 20 MIN: CPT | Performed by: OPHTHALMOLOGY

## 2025-03-13 ASSESSMENT — REFRACTION_MANIFEST
OS_AXIS: 035
OD_CYLINDER: -4.25
OS_CYLINDER: -4.50
OS_AXIS: 110
OD_AXIS: 055
OS_SPHERE: +5.50
OD_VA1: 20/50
OS_AXIS: 110
OD_SPHERE: +1.75
OD_VA1: 20/30
OS_SPHERE: +5.75
OD_SPHERE: +4.75
OS_CYLINDER: -2.50
OD_CYLINDER: -2.25
OS_VA1: 20/50
OD_SPHERE: +4.75
OS_ADD: +2.00
OS_CYLINDER: -4.75
OD_VA1: 20/30+
OS_SPHERE: +1.25
OD_AXIS: 060
OS_VA1: 20/20
OD_CYLINDER: -4.25
OD_AXIS: 074
OS_VA1: 20/20
OD_ADD: +2.00

## 2025-03-13 ASSESSMENT — REFRACTION_CURRENTRX
OD_SPHERE: +6.00
OD_VPRISM_DIRECTION: SV
OS_CYLINDER: -6.00
OS_VPRISM_DIRECTION: SV
OD_CYLINDER: -4.50
OD_OVR_VA: 20/
OD_AXIS: 51
OD_CYLINDER: -6.00
OS_SPHERE: +6.25
OS_OVR_VA: 20/
OS_AXIS: 108
OD_AXIS: 070
OD_OVR_VA: 20/
OD_SPHERE: +6.00
OS_SPHERE: +6.00
OS_AXIS: 106
OS_CYLINDER: -4.00
OS_OVR_VA: 20/

## 2025-03-13 ASSESSMENT — LID EXAM ASSESSMENTS
OD_BLEPHARITIS: RUL
OS_BLEPHARITIS: LUL

## 2025-03-13 ASSESSMENT — REFRACTION_AUTOREFRACTION
OD_SPHERE: +1.00
OS_AXIS: 055
OD_CYLINDER: -2.00
OS_CYLINDER: -1.50
OD_AXIS: 077
OS_SPHERE: +0.50

## 2025-03-13 ASSESSMENT — KERATOMETRY
OS_K2POWER_DIOPTERS: 45.01
OS_K1POWER_DIOPTERS: 41.75
OS_AXISANGLE_DEGREES: 180
OD_K1POWER_DIOPTERS: 41.00
METHOD_AUTO_MANUAL: AUTO
OD_K2POWER_DIOPTERS: 45.25
OD_AXISANGLE_DEGREES: 154

## 2025-03-13 ASSESSMENT — TONOMETRY: OS_IOP_MMHG: 12

## 2025-03-13 ASSESSMENT — CONFRONTATIONAL VISUAL FIELD TEST (CVF)
OD_FINDINGS: FULL
OS_FINDINGS: FULL

## 2025-03-13 ASSESSMENT — VISUAL ACUITY
OD_BCVA: 20/50
OS_BCVA: 20/100